# Patient Record
(demographics unavailable — no encounter records)

---

## 2025-03-04 NOTE — HISTORY OF PRESENT ILLNESS
[Never] : never [TextBox_4] : OBDULIA SYKES is a 48 year old male who presents for pulm fu PMH: HTN, CHF, AICD, reports never smoker, diagnosis of BRENDA? a few years ago not on any therapy  cough nocturnal apneas noctural poor sleep   + post nasal drip on singulair   metoprololg, corlander, breztri, entresto, gabapentin, torsemide, nifepdine, omeprazole

## 2025-03-04 NOTE — PROCEDURE
[FreeTextEntry1] : 3/2025- normal spirometry PA and lateral chest xray performed using standard projections. Bones and soft tissues structures are unremarkable.Cardiiomegaly. AICD. l. Lung fields are clear. No infiltrate or masses noted. Mediastinal contours are normal. IMPRESSION: cardiomegaly + AICD   previous data reviewed: the labs were drawn in the office today PA and lateral chest xray performed using standard projections. Bones and soft tissues structures are unremarkable. cariomegaly. aicd Lung fields are clear. No infiltrate or masses noted. Mediastinal contours are normal. IMPRESSION: nCardiomegaly. AICD  spirometry mild restriction niox 11

## 2025-05-20 NOTE — HISTORY OF PRESENT ILLNESS
[Never] : never [TextBox_4] : OBDULIA SYKES is a 489year old male who presents for HST result review  PMH: HTN, CHF, AICD, reports never smoker, diagnosis of BRENDA? a few years ago not on any therapy   + post nasal drip on singulair   metoprolol,  , breztri, entresto, gabapentin, torsemide, nifepdine, omeprazole   No changes since last visit

## 2025-05-20 NOTE — PROCEDURE
[FreeTextEntry1] : HST 74/hr; desat 50%  3/2025- normal spirometry PA and lateral chest xray performed using standard projections. Bones and soft tissues structures are unremarkable.Cardiiomegaly. AICD. l. Lung fields are clear. No infiltrate or masses noted. Mediastinal contours are normal. IMPRESSION: cardiomegaly + AICD   previous data reviewed: the labs were drawn in the office today PA and lateral chest xray performed using standard projections. Bones and soft tissues structures are unremarkable. cariomegaly. aicd Lung fields are clear. No infiltrate or masses noted. Mediastinal contours are normal. IMPRESSION: nCardiomegaly. AICD  spirometry mild restriction niox 11

## 2025-05-20 NOTE — PHYSICAL EXAM
[No Acute Distress] : no acute distress [Well Nourished] : well nourished [Well Developed] : well developed [No Resp Distress] : no resp distress [No Acc Muscle Use] : no acc muscle use [Clear to Auscultation Bilaterally] : clear to auscultation bilaterally [Oriented x3] : oriented x3 [1+ Pitting] : 1+ pitting